# Patient Record
Sex: MALE | Race: WHITE | Employment: FULL TIME | URBAN - METROPOLITAN AREA
[De-identification: names, ages, dates, MRNs, and addresses within clinical notes are randomized per-mention and may not be internally consistent; named-entity substitution may affect disease eponyms.]

---

## 2020-10-06 PROBLEM — Z09 POSTOP CHECK: Status: ACTIVE | Noted: 2020-06-09

## 2021-05-04 PROBLEM — E55.9 VITAMIN D DEFICIENCY: Status: ACTIVE | Noted: 2021-05-04

## 2021-05-04 PROBLEM — R79.89 LOW TESTOSTERONE IN MALE: Status: ACTIVE | Noted: 2021-05-04

## 2021-05-04 PROBLEM — E29.1 HYPOGONADISM IN MALE: Status: ACTIVE | Noted: 2021-05-04

## 2022-03-18 PROBLEM — Z09 POSTOP CHECK: Status: ACTIVE | Noted: 2020-06-09

## 2022-03-19 PROBLEM — E29.1 HYPOGONADISM IN MALE: Status: ACTIVE | Noted: 2021-05-04

## 2022-03-19 PROBLEM — E55.9 VITAMIN D DEFICIENCY: Status: ACTIVE | Noted: 2021-05-04

## 2022-03-19 PROBLEM — R79.89 LOW TESTOSTERONE IN MALE: Status: ACTIVE | Noted: 2021-05-04

## 2022-04-04 ENCOUNTER — HOSPITAL ENCOUNTER (OUTPATIENT)
Dept: SLEEP MEDICINE | Age: 52
Discharge: HOME | End: 2022-04-04
Payer: COMMERCIAL

## 2022-04-04 PROCEDURE — 95810 POLYSOM 6/> YRS 4/> PARAM: CPT

## 2022-05-16 ENCOUNTER — HOSPITAL ENCOUNTER (OUTPATIENT)
Dept: SLEEP MEDICINE | Age: 52
Discharge: HOME | End: 2022-05-16
Payer: COMMERCIAL

## 2022-05-16 PROCEDURE — 95811 POLYSOM 6/>YRS CPAP 4/> PARM: CPT

## 2022-06-02 ASSESSMENT — SLEEP AND FATIGUE QUESTIONNAIRES
HOW LIKELY ARE YOU TO NOD OFF OR FALL ASLEEP WHILE SITTING AND TALKING TO SOMEONE: 1
HOW LIKELY ARE YOU TO NOD OFF OR FALL ASLEEP WHILE WATCHING TV: 3
HOW LIKELY ARE YOU TO NOD OFF OR FALL ASLEEP WHEN YOU ARE A PASSENGER IN A CAR FOR AN HOUR WITHOUT A BREAK: 3
HOW LIKELY ARE YOU TO NOD OFF OR FALL ASLEEP IN A CAR, WHILE STOPPED FOR A FEW MINUTES IN TRAFFIC: 3
HOW LIKELY ARE YOU TO NOD OFF OR FALL ASLEEP WHILE SITTING AND READING: 3
HOW LIKELY ARE YOU TO NOD OFF OR FALL ASLEEP WHILE SITTING QUIETLY AFTER LUNCH WITHOUT ALCOHOL: 3
HOW LIKELY ARE YOU TO NOD OFF OR FALL ASLEEP WHILE LYING DOWN TO REST IN THE AFTERNOON WHEN CIRCUMSTANCES PERMIT: 3
ESS TOTAL SCORE: 21
HOW LIKELY ARE YOU TO NOD OFF OR FALL ASLEEP WHILE SITTING INACTIVE IN A PUBLIC PLACE: 2

## 2022-06-02 NOTE — PROGRESS NOTES
[address] [place] [address] Court, [address] [facility]  [phone]    Patient Name:  Otto Marcum  YOB: 1970      Office Visit 6/3/2022    CHIEF COMPLAINT:    Chief Complaint   Patient presents with    New Patient    Sleep Apnea       HISTORY OF PRESENT ILLNESS:      The patient presents in outpatient consultation at the request of ZUHAIR Dozier  for management of obstructive sleep apnea. PMH includes allergies, hypogonadism, vitamin D deficiency. Patient reports worsening daytime fatigue. He states that he will get drowsy with [place] [address] and has dozed when sitting in traffic. He works at [employer] from 6:30am-5:40 pm, 4 days per week. On days off, he naps about an hour in the afternoons. He reports snoring, witnessed apneas for many years. He always sleeps laterally. He wakes 7+ times per night and still has non-restorative sleep. He states that he will toss and turn and wake up 3+ times with nocturia. He has recently lost 10 pounds through portion control and eating out less. His current weight is 187 pounds. He drinks coffee in the mornings to help stay awake and tea in the afternoons. He denies any tobacco use. He drinks one to two beers per night and sometimes more on the weekends. He reports nocturnal gasping and vivid dreams. ESS 21/24. The diagnostic polysomnography was notable for an apnea hypopnea index of 37.8/hr including 10 obstructive apneas, 55 mixed apneas, 145 central apneas, 52 hypopneas. Oxygen desaturations are low as 83% were noted with SpO2 less than 89% for a total of 15.1 minutes of the test.  Significant cardiac arrhythmias were not evident. The patient was noted to have moderate limb movements with a limb movement arousal index being about 1.2. A subsequent CPAP titration study was conducted. CPAP levels as high as 13cm were performed. CPAP was effective in eliminating disordered breathing.  CPAP was tolerated well by the patient. The patient reports feeling more rested and less fatigued the day following. He was not studied in supine position on optimal pressure therefore, APAP 13-15 cm recommended. He is ready to get started on pap therapy. History reviewed. No pertinent past medical history. Patient Active Problem List   Diagnosis    Postop check    Low testosterone in male    Vitamin D deficiency    Hypogonadism in male          Past Surgical History:   Procedure Laterality Date    CHOLECYSTECTOMY, LAPAROSCOPIC  05/26/2020           Social History     Socioeconomic History    Marital status:      Spouse name: Not on file    Number of children: Not on file    Years of education: Not on file    Highest education level: Not on file   Occupational History    Not on file   Tobacco Use    Smoking status: Never Smoker    Smokeless tobacco: Never Used   Substance and Sexual Activity    Alcohol use: Yes     Alcohol/week: 7.0 standard drinks    Drug use: No    Sexual activity: Not on file   Other Topics Concern    Not on file   Social History Narrative    Not on file     Social Determinants of Health     Financial Resource Strain:     Difficulty of Paying Living Expenses: Not on file   Food Insecurity:     Worried About Running Out of Food in the Last Year: Not on file    Sridhar of Food in the Last Year: Not on file   Transportation Needs:     Lack of Transportation (Medical): Not on file    Lack of Transportation (Non-Medical):  Not on file   Physical Activity:     Days of Exercise per Week: Not on file    Minutes of Exercise per Session: Not on file   Stress:     Feeling of Stress : Not on file   Social Connections:     Frequency of Communication with Friends and Family: Not on file    Frequency of Social Gatherings with Friends and Family: Not on file    Attends Gnosticism Services: Not on file    Active Member of Clubs or Organizations: Not on file    Attends Club or Organization Meetings: Not on file    Marital Status: Not on file   Intimate Partner Violence:     Fear of Current or Ex-Partner: Not on file    Emotionally Abused: Not on file    Physically Abused: Not on file    Sexually Abused: Not on file   Housing Stability:     Unable to Pay for Housing in the Last Year: Not on file    Number of [place] in the Last Year: Not on file    Unstable Housing in the Last Year: Not on file         Family History   Problem Relation Age of Onset    No Known Problems Mother     No Known Problems Father     No Known Problems Maternal Grandmother     No Known Problems Maternal Grandfather     No Known Problems Paternal Grandmother     No Known Problems Paternal Grandfather          No Known Allergies      Current Outpatient Medications   Medication Sig    cetirizine (ZYRTEC) 10 MG tablet Take 10 mg by mouth daily    fluticasone (FLONASE) 50 MCG/ACT nasal spray 2 sprays by Nasal route 2 times daily     No current facility-administered medications for this visit. REVIEW OF SYSTEMS:   CONSTITUTIONAL: weight loss    There is no history of fever, chills, night sweats,  weight gain, persistent fatigue, or lethargy/hypersomnolence. EYES:   Denies problems with eye pain, erythema, blurred vision, or visual field loss. ENTM:   Denies history of tinnitus, epistaxis, sore throat, hoarseness, or dysphonia. LYMPH:   Denies swollen glands. CARDIAC:   No chest pain, pressure, discomfort, palpitations, orthopnea, murmurs, or edema. GI:   No dysphagia, heartburn reflux, nausea/vomiting, diarrhea, abdominal pain, or bleeding. :   Denies history of dysuria, hematuria, polyuria, or decreased urine output. MS:   No history of myalgias, arthralgias, bone pain, or muscle cramps. SKIN:   No history of rashes, jaundice, cyanosis, nodules, or ulcers. ENDO:   Negative for heat or cold intolerance. No history of DM.    PSYCH:   Negative for anxiety, depression, insomnia, hallucinations. NEURO:   There is no history of AMS, persistent headache, decreased level of consciousness, seizures, or motor or sensory deficits. PHYSICAL EXAM:    Vitals:    06/03/22 1527   BP: 132/82   Pulse: 89   Resp: 14   Temp: 97.3 °F (36.3 °C)   SpO2: 96%   weight 187 lbs, BMI 27.62     GENERAL APPEARANCE:   The patient is overweight, BMI, 27,  and in no respiratory distress. HEENT:   PERRL. Conjunctivae unremarkable. Nasal mucosa is without epistaxis, exudate, or polyps. Gums and dentition are unremarkable. There is no oropharyngeal narrowing. TMs are clear. NECK/LYMPHATIC:   Symmetrical with no elevation of jugular venous pulsation. Trachea midline. No thyroid enlargement. No cervical adenopathy. LUNGS:   Normal respiratory effort with symmetrical lung expansion. Breath sounds clear bilaterally. HEART:   There is a regular rate and rhythm. No murmur, rub, or gallop. There is no edema in the lower extremities. ABDOMEN:   Soft and non-tender. No hepatosplenomegaly. Bowel sounds are normal.     SKIN:   There are no rashes, cyanosis, jaundice, or ecchymosis present. EXTREMITIES:   The extremities are unremarkable without clubbing, cyanosis, joint inflammation, degenerative, or ischemic change. MUSCULOSKELETAL:   There is no abnormal tone, muscle atrophy, or abnormal movement present. NEURO:   The patient is alert and oriented to person, place, and time. Memory appears intact and mood is normal.  No gross sensorimotor deficits are present. DIAGNOSTIC TESTS:  Nspg 4/4/22      Cpap Titration 5/16/22        ASSESSMENT:  (Medical Decision Making)        Diagnosis Orders   1. JUANIS (obstructive sleep apnea) this is severe. Improved with pap therapy   The pathophysiology of obstructive sleep apnea was reviewed with the patient. It's potential to promote severe neurologic, cardiac, pulmonary, and gastrointestinal problems was discussed.  Specifically, the increased incidence of hypertension, coronary artery disease, congestive heart failure, pulmonary hypertension, gastroesophageal reflux, pathologic hypersomnolence, memory loss, and glucose intolerance was related to the consequences of hypoxemia, hypercapnia, airway obstruction, and sympathetic overdrive. We also discussed the ability of nasal CPAP to correct these abnormalities through maintenance of a patent airway. Therapeutic options including surgery, oral appliances, and weight loss were also reviewed. DME - DURABLE MEDICAL EQUIPMENT   2. Nocturnal hypoxemia - resolved with pap therapy    DME - DURABLE MEDICAL EQUIPMENT   3. Central sleep apnea - moderate, noted on study, improved with CPAP  DME - DURABLE MEDICAL EQUIPMENT   4. Poor sleep hygiene - with nightly alcohol use which can disrupt sleep architecture and promote sleep apnea. Encouraged to limit alcohol and avoid within 3-4 hours of bedtime. DME - DURABLE MEDICAL EQUIPMENT          PLAN:  Start APAP 13-15 cm with nasal mask. Nightly use encouraged  Set up will be ASAP with [employer]. Follow up in 3-4 months or sooner if needed      Orders Placed This Encounter   Procedures    DME - [address] [place] [address] [facility]  Phone: [employer] [address] [address]  Dept: [phone]      Patient Name: Domenic Bhakta  : 1970  Gender: male  Address: [place] [id number]  Patient phone number: [phone] (home)       Primary Insurance: Payor: [employer] / Plan: Mayda Natarajan / Product Type: *No Product type* /   Subscriber ID: [id number] - ([facility])      [facility] Supply Order  Order Details     DME Location: [facility], please set up asap   Order Date: 6/3/2022   There were no encounter diagnoses.           (  X   )New Set-Up     apap machine   (     )[id number] CPAP Unit  ( x    ) Auto CPAP Unit  (     ) BiLevel Unit  (     ) Auto BiLevel Unit  (     ) ASV   ( ) Bilevel ST    (     ) Oxygen Concentrator         Length of need: 12 months    Pressure: 13-15  cmH20  EPR:      Starting Ramp Pressure:   cm H20  Ramp Time: min      Patient had a diagnostic Apnea Hypopnea Index (AHI) of :  37.8/hr    *SUPPLIES* Replace all as needed, or per coverage guidelines     Masks Type:    (     ) -Full Face Mask (1 per 3 mon)  (     ) -Full Mask (1 per month) Interface/Cushion      (   x  ) -Nasal Mask (1 per 3 mon)  (  x   ) - Nasal Mask (1 per month) Interface/Cushion  (   x  ) -Pillow (2 per mon)  (   x  ) -Kpzseuhqe (1 per 6 mon)      _________________________________________________________________          Other Supplies:    (  X   )-Qjizwgob (1 per 6 mon)  ( X    )-Njjdxh Tubing (1 per 3 mon)  (  X   )- Disposable Filter (2 per mon)  (   X  )-Yyodcf Humidifier (1 per year)     (  x   )-Fdnfwvxyy (sometimes used with Full Face Mask) (1 per 6 mos)  (     )-Tubing-without heat (1 per 3 mos)  ( X   )-Non-Disposable Filter (1 per 6 mos)  (   x  )-Water Chamber (1 per 6 mos)  (     )-Humidifier non-heated (1 per 5 yrs)      Signed Date: 6/3/2022  Electronically Signed By: MANI Gonzalez Aas, CNP      No orders of the defined types were placed in this encounter. Collaborating Physician: Dr. Amy Kirk     Over 50% of today's office visit was spent in face to face time reviewing test results, prognosis, importance of compliance, education about disease process, benefits of medications, instructions for management of acute flare-ups, and follow up plans. Total face to face time spent with patient was 35 minutes.     MANI Gonzalez Aas, CNP  Electronically signed

## 2022-06-03 ENCOUNTER — OFFICE VISIT (OUTPATIENT)
Dept: SLEEP MEDICINE | Age: 52
End: 2022-06-03
Payer: COMMERCIAL

## 2022-06-03 VITALS
RESPIRATION RATE: 14 BRPM | OXYGEN SATURATION: 96 % | HEIGHT: 69 IN | TEMPERATURE: 97.3 F | WEIGHT: 187 LBS | HEART RATE: 89 BPM | SYSTOLIC BLOOD PRESSURE: 132 MMHG | DIASTOLIC BLOOD PRESSURE: 82 MMHG | BODY MASS INDEX: 27.7 KG/M2

## 2022-06-03 DIAGNOSIS — G47.34 NOCTURNAL HYPOXEMIA: ICD-10-CM

## 2022-06-03 DIAGNOSIS — Z72.821 POOR SLEEP HYGIENE: ICD-10-CM

## 2022-06-03 DIAGNOSIS — G47.31 CENTRAL SLEEP APNEA: ICD-10-CM

## 2022-06-03 DIAGNOSIS — G47.33 OSA (OBSTRUCTIVE SLEEP APNEA): Primary | ICD-10-CM

## 2022-06-03 PROCEDURE — 99203 OFFICE O/P NEW LOW 30 MIN: CPT | Performed by: NURSE PRACTITIONER

## 2022-06-03 NOTE — PATIENT INSTRUCTIONS
Start APAP 13-15 cm. Order will be sent to [address] [place] [address] for set up asap  Nightly compliance encouraged    Follow up in 3-4 months  The company who will be taking care of your CPAP supplies is:     Address: [address] [place] [address] [address], [facility], [address] [place] [address]  Phone: [phone] Option #1  Fax: [phone]

## 2022-09-06 RX ORDER — FLUTICASONE PROPIONATE 50 MCG
SPRAY, SUSPENSION (ML) NASAL
Qty: 48 G | OUTPATIENT
Start: 2022-09-06

## 2022-12-20 NOTE — PROGRESS NOTES
[address] [place] [address] Court, [address] [facility]  [phone]    PATIENT ID    Mr. Raegan Toussaint  1970  His primary provider is Kera Montano PA-C    CC: Mr. Roxie Robertson returns to the clinic today for follow up of his obstructive sleep apnea. INTERVAL HISTORY  Mr. Roxie Robertson was originally diagnosed with severe combined obstructive and central sleep apnea obstructive sleep apnea in 2022 with an AHI of 37 central apnea index 21, subsequently underwent CPAP titration with successful control at APAP 12-16 cm H2O. He has past medical history notable for allergic rhinitis low vitamin D low testosterone  he is here for follow-up and I am meeting him for the first time today. He was unaware that he has central sleep apnea as well as obstructive sleep apnea, denies any history of difficulty with sedation, he has not had his colonoscopy yet. He notes he had severe daytime sleepiness that has improved dramatically since being on therapy but is still not completely controlled. He has been struggling with the mask leak, he notes he never has been able to sleep on his back. He is using what sounds like a FP Vitera mask but did not bring his mask or machine today, he gets his equipment at [employer]. He has not had any significant health changes or medication changes since last visit. He denies any lower extremity edema orthopnea or PND, no history of congestive heart failure, no neurologic history of seizures or muscle weakness. He was last seen by Kelly Benton in 6/3/2022 and was started on auto CPAP therapy. Since implementing CPAP therapy Mr. Roxie Robertson feels more rested and less fatigued. On CPAP download review today he is utilizing his CPAP machine 79% of the time, greater than 4 hours per night, for a median daily usage of 5 hours 39 minutes per night. His AHI is down to 1.2 and his leak rate is unacceptably high.   Past medical and surgical histories, medications and allergies, problem list, and social history were reviewed. ROS   Review of systems was otherwise negative unless noted in HPI. Sleep Medicine 12/21/2022 6/2/2022   Sitting and reading 2 3   Watching TV 2 3   Sitting, inactive in a public place (e.g. a theatre or a meeting) 2 2   As a passenger in a car for an hour without a break 0 3   Lying down to rest in the afternoon when circumstances permit 2 3   Sitting and talking to someone 0 1   Sitting quietly after a lunch without alcohol 2 3   In a car, while stopped for a few minutes in traffic 0 3   [place] Sleepiness Score 10 21           MEDS  Current Outpatient Medications   Medication Sig Dispense Refill    fluticasone (FLONASE) 50 MCG/ACT nasal spray 2 sprays by Nasal route 2 times daily 16 g 2    cetirizine (ZYRTEC) 10 MG tablet Take 10 mg by mouth daily       No current facility-administered medications for this visit. ALLERGIES  No Known Allergies    OBJECTIVE   BP (!) 140/78   Pulse 91   Temp 97.5 °F (36.4 °C)   Resp 16   Ht 5' 8\" (1.727 m)   Wt 196 lb 1.6 oz (89 kg)   SpO2 97%   BMI 29.82 kg/m²         Exam:  Constitutional: Pleasant 50 y.o. male, appears well-developed and well-nourished. Not in acute distress. Eyes: Conjunctivae and lids appear normal. No pallor or icterus   Skin: Not diaphoretic. No lesions  HENT: Head normocephalic. Nares very tight nasal passages with boggy mucosa, no drainage oropharynx clear without erythema or exudate. Moist mucous membranes. Mallampati is 2, minimal tonsillar tissue. Tongue is normal. Patient has adequate dentition. Normal palate. Mild retrognathia  Cardiovascular: nl s1/s2, RRR no m/r/g   Pulmonary: Respiratory effort is normal without labored breathing or accessory muscle use. No respiratory distress/cough/wheezng  Neurological: alert , Attention, speech and language are normal. There is facial symmetry. Musculoskeletal:  Moves all extremities equally and no focal weakness noted. Gait is unassisted.   Psychiatric: pleasant, mood and affect well adjusted. cooperative and behavior is appropriate. Results:  Polysomnogram from 4/22 as well as 5/22 reviewed            ASSESSMENT AND PLAN  1. JUANIS (obstructive sleep apnea)  -     Ambulatory referral to Cardiology  2. Seasonal allergic rhinitis, unspecified trigger  Assessment & Plan:  Patient with nasal congestion on exam today, will refill Flonase as well as restart cetirizine, maintaining upper airway patency important for sleep disordered breathing. He should probably avoid nasal masks in the near future. Orders:  -     fluticasone (FLONASE) 50 MCG/ACT nasal spray; 2 sprays by Nasal route 2 times daily, Disp-16 g, R-2Normal  3. CSA (central sleep apnea)  Assessment & Plan:  Patient has combined obstructive and central sleep apnea with predominance of central events, he has not had a work-up for central sleep apnea, normal exam today. Given severe disease we will order echocardiogram to both rule out pulmonary hypertension as well as congestive heart failure although I have a low clinical suspicion for heart failure based on exam, will order brain MRI to rule out structural lesion as well as ABG to rule out hypoventilation. We will go over results at follow-up in 2 months, sooner if needed. In terms of PAP therapy we did discuss measures to help increase mask seal and we will order a mask fitting, we will continue current settings for now. We did discuss how sedation and narcotics can make central sleep apnea worse. Orders:  -     Ambulatory referral to Cardiology  -     MRI BRAIN WO CONTRAST; Future  -     Miscellaneous Sendout; Future       Mr. Susanne Mace is  compliant with appropriate usage and getting good clinical response to his JUANIS treatment. The change in his AHI was compared to the baseline, and the benefits of treatment were discussed. Usage, with need for compliance to see best benefits, was explained with need to use the device more than 4 hours a night. Usage for more than 6 hours would provide even further benefit and was explained. He is currently at a median use of near 6 hours. He although compliant, still needs to improve on total usage hours per night. Equipment orders will be renewed as appropriate. Instructions on CPAP usage, proper maintenance and device cleaning were included in the after visit summary today. He should not drive if drowsy or if he has had inadequate sleep. I will see him back in 2 months. Time Spent: Time spent total 40 minutes exclusive of procedures, during the 24 hour period of the date of encounter: preparing to see the patient (e.g. reviewing chart notes, tests), performing a medically appropriate examination and/or evaluation, documenting clinical information in the electronic or other health record, counseling and educating the patient/family/caregiver, obtaining and/or reviewing separately obtained history and ordering medications, tests, procedures. CPAP download was reviewed with the patient in detail today. Jaclyn Perea Is doing well today and getting excellent clinical response from CPAP therapy. I encouraged him to continue utilizing his CPAP machine.      Milton Mensah MD  Sleep Medicine/Internal Medicine/Pediatrics

## 2022-12-21 ENCOUNTER — HOSPITAL ENCOUNTER (OUTPATIENT)
Dept: LAB | Age: 52
Discharge: HOME | End: 2022-12-24
Payer: COMMERCIAL

## 2022-12-21 ENCOUNTER — OFFICE VISIT (OUTPATIENT)
Dept: SLEEP MEDICINE | Age: 52
End: 2022-12-21
Payer: COMMERCIAL

## 2022-12-21 VITALS
SYSTOLIC BLOOD PRESSURE: 140 MMHG | HEART RATE: 91 BPM | RESPIRATION RATE: 16 BRPM | TEMPERATURE: 97.5 F | HEIGHT: 68 IN | WEIGHT: 196.1 LBS | DIASTOLIC BLOOD PRESSURE: 78 MMHG | BODY MASS INDEX: 29.72 KG/M2 | OXYGEN SATURATION: 97 %

## 2022-12-21 DIAGNOSIS — G47.31 CSA (CENTRAL SLEEP APNEA): ICD-10-CM

## 2022-12-21 DIAGNOSIS — G47.33 OSA (OBSTRUCTIVE SLEEP APNEA): Primary | ICD-10-CM

## 2022-12-21 DIAGNOSIS — J30.2 SEASONAL ALLERGIC RHINITIS, UNSPECIFIED TRIGGER: ICD-10-CM

## 2022-12-21 LAB
BASE EXCESS BLDV CALC-SCNC: 4.2 MMOL/L
HCO3 BLDV-SCNC: 32.1 MMOL/L (ref 23–28)
PCO2 BLDV: 58.3 MMHG (ref 41–51)
PH BLDV: 7.35 [PH] (ref 7.32–7.42)
PO2 BLDV: 27 MMHG
SAO2 % BLDV: 45.8 % (ref 65–88)
SERVICE CMNT-IMP: ABNORMAL
SERVICE CMNT-IMP: ABNORMAL
SPECIMEN TYPE: ABNORMAL

## 2022-12-21 PROCEDURE — 82803 BLOOD GASES ANY COMBINATION: CPT

## 2022-12-21 PROCEDURE — 99215 OFFICE O/P EST HI 40 MIN: CPT | Performed by: INTERNAL MEDICINE

## 2022-12-21 RX ORDER — FLUTICASONE PROPIONATE 50 MCG
2 SPRAY, SUSPENSION (ML) NASAL 2 TIMES DAILY
Qty: 16 G | Refills: 2 | Status: SHIPPED | OUTPATIENT
Start: 2022-12-21

## 2022-12-21 ASSESSMENT — SLEEP AND FATIGUE QUESTIONNAIRES
HOW LIKELY ARE YOU TO NOD OFF OR FALL ASLEEP WHILE SITTING AND TALKING TO SOMEONE: 0
HOW LIKELY ARE YOU TO NOD OFF OR FALL ASLEEP WHILE SITTING INACTIVE IN A PUBLIC PLACE: 2
HOW LIKELY ARE YOU TO NOD OFF OR FALL ASLEEP WHILE SITTING AND READING: 2
ESS TOTAL SCORE: 10
HOW LIKELY ARE YOU TO NOD OFF OR FALL ASLEEP IN A CAR, WHILE STOPPED FOR A FEW MINUTES IN TRAFFIC: 0
HOW LIKELY ARE YOU TO NOD OFF OR FALL ASLEEP WHILE LYING DOWN TO REST IN THE AFTERNOON WHEN CIRCUMSTANCES PERMIT: 2
HOW LIKELY ARE YOU TO NOD OFF OR FALL ASLEEP WHILE SITTING QUIETLY AFTER LUNCH WITHOUT ALCOHOL: 2
HOW LIKELY ARE YOU TO NOD OFF OR FALL ASLEEP WHILE WATCHING TV: 2
HOW LIKELY ARE YOU TO NOD OFF OR FALL ASLEEP WHEN YOU ARE A PASSENGER IN A CAR FOR AN HOUR WITHOUT A BREAK: 0

## 2022-12-21 NOTE — ASSESSMENT & PLAN NOTE
Patient has combined obstructive and central sleep apnea with predominance of central events, he has not had a work-up for central sleep apnea, normal exam today. Given severe disease we will order echocardiogram to both rule out pulmonary hypertension as well as congestive heart failure although I have a low clinical suspicion for heart failure based on exam, will order brain MRI to rule out structural lesion as well as ABG to rule out hypoventilation. We will go over results at follow-up in 2 months, sooner if needed. In terms of PAP therapy we did discuss measures to help increase mask seal and we will order a mask fitting, we will continue current settings for now. We did discuss how sedation and narcotics can make central sleep apnea worse.

## 2022-12-21 NOTE — ASSESSMENT & PLAN NOTE
This is dramatically improved on PAP therapy, will send refills of supplies and mask fitting order to [facility] today.

## 2022-12-21 NOTE — PATIENT INSTRUCTIONS
It was a pleasure meeting you today. Here are some items that we discussed:    1. You have combined central and obstructive sleep apnea. 2.   We will do work up for your central sleep apnea to included the following:  -echocardiogram  -brain MRI  -arterial blood gas    3. We will order a new mask fitting with [employer] today, they should call you in a week, will order supplies as well. If you are still having leak/issues with cpap bring your machine and gear to followup appointment.  -for now be sure to wash your mask every other day with warm soapy water, keep stubble to a minimum to help with seal.  -look into CPAP pillow on [employer]    4. Continue flonase/cetirizine for nasal congestion/allergies    Zeenat Yusuf MD  [phone]     Please continue to use your CPAPYou are getting a good response with it. To get the best benefits from CPAP therapy you will need to use your device for all periods of sleep, this includes naps. Please do not drive if you are sleepy. I will see you back in 2 months. See your Durable Medical Equipment (DME) provider:  For any mask fit issues and equipment replacement  Use it every time you go to sleep, day or night. Replace your mask cushion, headgear, and filters regularly. Clean your mask daily and the rest every one to two weeks. If any other issues or questions about your use of CPAP, mask, or pressure, arise, please call your medical equipment company. If you are still having issues, please  use [employer] or call to contact our sleep clinic so that we can assist you. If you have issues that are not about your CPAP (example: medication refills or side effects), please call the  at [phone]. Please work on maintaining a healthy weight. A BMI <30 is considered a healthy weight. Current Body mass index is 29.82 kg/m². . If you are overweight, a loss of 2 pounds per month still comes out to about 25 pounds per year and could allow a reduction in CPAP pressure or even discontinuation of CPAP. Cleaning instructions      Daily:  Cushion on mask: wash with soap/water OR wipe with an alcohol-free baby wipe    Once per Week:  Mask (frame/headgear): wash with soap/water   Filter: check for cleanliness, Replace (do not wash) monthly, or more often whenever dawn/dirty    Twice a month   Water Tub / Tubing: soak for 20-30 minutes in solution then rinse          Soaking Options: 1) water/vinegar solution (3 parts water, 1 part vinegar)     Some companies may tell you to add a little bit of bleach. However I find that bleach to be too toxic and difficult to rinse adequately. Supply Replacement Schedule (what insurance will cover)    You may not need to replace all parts this frequently if you are doing proper cleaning. Filters: 2 per month  Nasal Cushion/Pillow: 2 per month  Full Face cushion: 1 per month  Tubin every 3 months  Full Mask: 1 every 6 months  Headgear: 1 every 6 months  Water Chamber: 1 every 6 months  Chinstrap: 1 every 6 months      Many patients struggle to find comfortable heat and humidity settings on their device. If you are having issues with condensation in your mask or tubing, it usually means your temperature is too low, and/or your humidity is too high. If you are experiencing nasal congestion, it usually means you would benefit from higher humidity setting. Most people on nasal pillows are more comfortable if the heat and humidity are relatively high, such as 84-86 degrees, and 5-6 humidity. You can adjust these settings yourself to find what is comfortable for you. You can always call us or your equipment company for help, as well. Contact your equipment vendor for replacement supplies whenever in need.

## 2022-12-21 NOTE — ASSESSMENT & PLAN NOTE
Patient with nasal congestion on exam today, will refill Flonase as well as restart cetirizine, maintaining upper airway patency important for sleep disordered breathing. He should probably avoid nasal masks in the near future.

## 2023-01-11 ENCOUNTER — INITIAL CONSULT (OUTPATIENT)
Dept: CARDIOLOGY CLINIC | Age: 53
End: 2023-01-11
Payer: COMMERCIAL

## 2023-01-11 VITALS
HEIGHT: 68 IN | SYSTOLIC BLOOD PRESSURE: 156 MMHG | BODY MASS INDEX: 29.55 KG/M2 | WEIGHT: 195 LBS | HEART RATE: 71 BPM | DIASTOLIC BLOOD PRESSURE: 100 MMHG

## 2023-01-11 DIAGNOSIS — I51.7 LVH (LEFT VENTRICULAR HYPERTROPHY): ICD-10-CM

## 2023-01-11 DIAGNOSIS — Z76.89 ENCOUNTER TO ESTABLISH CARE: Primary | ICD-10-CM

## 2023-01-11 PROCEDURE — 93000 ELECTROCARDIOGRAM COMPLETE: CPT | Performed by: INTERNAL MEDICINE

## 2023-01-11 PROCEDURE — 99203 OFFICE O/P NEW LOW 30 MIN: CPT | Performed by: INTERNAL MEDICINE

## 2023-01-11 ASSESSMENT — ENCOUNTER SYMPTOMS
SHORTNESS OF BREATH: 0
RESPIRATORY NEGATIVE: 1
GASTROINTESTINAL NEGATIVE: 1
EYES NEGATIVE: 1
EYE PAIN: 0
ALLERGIC/IMMUNOLOGIC NEGATIVE: 1
BACK PAIN: 0
CHEST TIGHTNESS: 0
PHOTOPHOBIA: 0
ABDOMINAL PAIN: 0

## 2023-01-11 NOTE — PROGRESS NOTES
[address], [address] [facility], [address] [place] [address]  PHONE: [phone]    Marii Harry  1970    SUBJECTIVE:   Marii Harry is a 50 y.o. male seen for initial evaluation of:      Chief Complaint   Patient presents with    Consultation      evaluate patient for Echocardiogram test only for central sleep apnea, r/o chf        Cardiac Hx (Reviewed and summarized by me):  1) JUANIS (?central sleep apnea)  2) HTN  3) LVH by ECG criteria    HPI:  82-year-old male followed by sleep medicine they are concerned he may have central sleep apnea. Sleep medicine wanted to get an echocardiogram to rule out any cardiac issues. His EKG does show signs of LVH. His blood pressure is elevated. Chest pain palpitations orthopnea PND lower extremity edema. ECG: SR with LVH and associated repolarization abnormalities    Past Medical History, Past Surgical History, Family history, Social History, and Medications were all reviewed with the patient today and updated as necessary. Current Outpatient Medications:     fluticasone (FLONASE) 50 MCG/ACT nasal spray, 2 sprays by Nasal route 2 times daily, Disp: 16 g, Rfl: 2    cetirizine (ZYRTEC) 10 MG tablet, Take 10 mg by mouth daily, Disp: , Rfl:   No Known Allergies  History reviewed. No pertinent past medical history. Past Surgical History:   Procedure Laterality Date    CHOLECYSTECTOMY, LAPAROSCOPIC  05/26/2020     Family History   Problem Relation Age of Onset    No Known Problems Mother     No Known Problems Father     No Known Problems Maternal Grandmother     No Known Problems Maternal Grandfather     No Known Problems Paternal Grandmother     No Known Problems Paternal Grandfather      Social History     Tobacco Use    Smoking status: Never    Smokeless tobacco: Never   Substance Use Topics    Alcohol use: Yes     Alcohol/week: 7.0 standard drinks       ROS:  Review of Systems   Constitutional: Negative. Negative for fever.    HENT:  Negative for hearing loss, nosebleeds and tinnitus. Eyes: Negative. Negative for photophobia and pain. Respiratory: Negative. Negative for chest tightness and shortness of breath. Cardiovascular: Negative. Negative for chest pain, palpitations and leg swelling. Gastrointestinal: Negative. Negative for abdominal pain. Endocrine: Negative. Negative for cold intolerance and heat intolerance. Genitourinary: Negative. Negative for dysuria. Musculoskeletal: Negative. Negative for back pain and joint swelling. Skin: Negative. Negative for rash. Allergic/Immunologic: Negative. Negative for immunocompromised state. Neurological: Negative. Negative for dizziness, syncope and light-headedness. Hematological: Negative. Does not bruise/bleed easily. Psychiatric/Behavioral: Negative. Negative for suicidal ideas. PHYSICAL EXAM:  Physical Exam  Constitutional:       General: He is not in acute distress. Appearance: He is not ill-appearing. HENT:      Head: Normocephalic and atraumatic. Nose: No congestion. Mouth/Throat:      Mouth: Mucous membranes are moist.   Eyes:      Extraocular Movements: Extraocular movements intact. Pupils: Pupils are equal, round, and reactive to light. Cardiovascular:      Rate and Rhythm: Normal rate and regular rhythm. Heart sounds: No murmur heard. No friction rub. No gallop. Pulmonary:      Effort: No respiratory distress. Breath sounds: No wheezing or rhonchi. Musculoskeletal:         General: No swelling. Cervical back: Normal range of motion. Right lower leg: No edema. Left lower leg: No edema. Skin:     General: Skin is warm and dry. Findings: No rash. Neurological:      General: No focal deficit present. Mental Status: He is oriented to person, place, and time.    Psychiatric:         Mood and Affect: Mood normal.         Behavior: Behavior normal.         Judgment: Judgment normal.        BP (!) 156/100 Pulse 71   Ht 5' 8\" (1.727 m)   Wt 195 lb (88.5 kg)   BMI 29.65 kg/m²      Wt Readings from Last 10 Encounters:   01/11/23 195 lb (88.5 kg)   12/21/22 196 lb 1.6 oz (89 kg)   06/03/22 187 lb (84.8 kg)   03/14/22 197 lb (89.4 kg)   03/18/21 197 lb 3.2 oz (89.4 kg)           Medical problems and test results were reviewed with the patient today. Lab Results   Component Value Date/Time    BUN 11 11/04/2020 10:46 AM     No results found for: ALEX, CREAPOC, CREA  Lab Results   Component Value Date/Time    K 5.0 11/04/2020 10:46 AM       Lab Results   Component Value Date/Time    CHOL 202 11/04/2020 10:46 AM    HDL 45 11/04/2020 10:46 AM    VLDL 24 11/04/2020 10:46 AM       ASSESSMENT and PLAN    ICD-10-CM    1. Encounter to establish care  Z76.89 EKG 12 lead      2. LVH (left ventricular hypertrophy)  I51.7 Transthoracic echocardiogram (TTE) complete with contrast, bubble, strain, and 3D PRN          IMPRESSION:  1) Central Sleep Apnea - check echo  2) Abnormal ECG - echo as above  3) HTN - asked him to keep BP log will discuss at follow up, may be white coat htn. ORDERS  Orders Placed This Encounter    EKG 12 lead     Order Specific Question:   Reason for Exam?     Answer: Other       Follow up in 1 months. Thank you for allowing me to participate in this patient's care. Please call or contact me if there are any questions or concerns regarding the above.       Diana Hassan MD  01/11/23  8:32 AM

## 2023-01-12 ENCOUNTER — HOSPITAL ENCOUNTER (OUTPATIENT)
Dept: MRI IMAGING | Age: 53
Discharge: HOME | End: 2023-01-12
Payer: COMMERCIAL

## 2023-01-12 DIAGNOSIS — G47.31 CSA (CENTRAL SLEEP APNEA): ICD-10-CM

## 2023-01-12 PROCEDURE — 70551 MRI BRAIN STEM W/O DYE: CPT

## 2023-01-13 ENCOUNTER — TELEPHONE (OUTPATIENT)
Dept: SLEEP MEDICINE | Age: 53
End: 2023-01-13

## 2023-01-13 DIAGNOSIS — G47.31 CSA (CENTRAL SLEEP APNEA): Primary | ICD-10-CM

## 2023-01-13 NOTE — TELEPHONE ENCOUNTER
Brain MRI ordered for primary central sleep apnea and does show mild Chiari malformation, call patient to review the results and recommend neurosurgery evaluation, he is agreeable so referral will be placed today, for now he is to continue CPAP. We discussed I am not sure if mild disease warrants decompression surgery or not. 1/13/2022 brain MRI results  1. Mild Chiari I malformation. 2. Nonspecific white matter changes likely related to headaches, although mild   early microangiopathy cannot be excluded. 3. Extensive right maxillary, ethmoid, and frontal sinusitis.      Will keep follow-up as scheduled with Dr. Cintron Rom

## 2023-02-13 ENCOUNTER — OFFICE VISIT (OUTPATIENT)
Dept: CARDIOLOGY CLINIC | Age: 53
End: 2023-02-13
Payer: COMMERCIAL

## 2023-02-13 VITALS
HEART RATE: 72 BPM | DIASTOLIC BLOOD PRESSURE: 80 MMHG | SYSTOLIC BLOOD PRESSURE: 118 MMHG | HEIGHT: 68 IN | WEIGHT: 194.7 LBS | BODY MASS INDEX: 29.51 KG/M2

## 2023-02-13 DIAGNOSIS — I51.7 LVH (LEFT VENTRICULAR HYPERTROPHY): Primary | ICD-10-CM

## 2023-02-13 PROCEDURE — 99213 OFFICE O/P EST LOW 20 MIN: CPT | Performed by: INTERNAL MEDICINE

## 2023-02-13 ASSESSMENT — ENCOUNTER SYMPTOMS
EYES NEGATIVE: 1
ALLERGIC/IMMUNOLOGIC NEGATIVE: 1
GASTROINTESTINAL NEGATIVE: 1
SHORTNESS OF BREATH: 0
CHEST TIGHTNESS: 0
BACK PAIN: 0
PHOTOPHOBIA: 0
EYE PAIN: 0
RESPIRATORY NEGATIVE: 1
ABDOMINAL PAIN: 0

## 2023-02-13 NOTE — PROGRESS NOTES
[facility] CARDIOLOGY  [address], [address]  PHONE: [phone]      23    NAME:  Perry Lucero  : 1970  MRN: [id number]         SUBJECTIVE:   Perry Lucero is a 50 y.o. male seen for follow up of:      Chief Complaint   Patient presents with    Other     LVH (left ventricular hypertrophy)         Cardiac Hx (Reviewed and summarized by me):  1) JUANIS (?central sleep apnea)  2) HTN  3) LVH by ECG criteria  4) Echo   23 - LVEF 60-65% with moderate LVH normal LVDF      HPI:  Echo showed mild LVH with anterior/posterior wall thickness of 1.2 cm. Doing well otherwise. Is tolerating CPAP. Blood pressure is excellent today. Past Medical History, Past Surgical History, Family history, Social History, and Medications were all reviewed with the patient today and updated as necessary. Current Outpatient Medications:     fluticasone (FLONASE) 50 MCG/ACT nasal spray, 2 sprays by Nasal route 2 times daily, Disp: 16 g, Rfl: 2    cetirizine (ZYRTEC) 10 MG tablet, Take 10 mg by mouth daily (Patient not taking: Reported on 2023), Disp: , Rfl:   No Known Allergies  History reviewed. No pertinent past medical history. Past Surgical History:   Procedure Laterality Date    CHOLECYSTECTOMY, LAPAROSCOPIC  2020     Family History   Problem Relation Age of Onset    No Known Problems Mother     No Known Problems Father     No Known Problems Maternal Grandmother     No Known Problems Maternal Grandfather     No Known Problems Paternal Grandmother     No Known Problems Paternal Grandfather      Social History     Tobacco Use    Smoking status: Never    Smokeless tobacco: Never   Substance Use Topics    Alcohol use: Yes     Alcohol/week: 7.0 standard drinks       ROS:  Review of Systems   Constitutional: Negative. Negative for fever. HENT:  Negative for hearing loss, nosebleeds and tinnitus. Eyes: Negative. Negative for photophobia and pain. Respiratory: Negative. Negative for chest tightness and shortness of breath. Cardiovascular: Negative. Negative for chest pain, palpitations and leg swelling. Gastrointestinal: Negative. Negative for abdominal pain. Endocrine: Negative. Negative for cold intolerance and heat intolerance. Genitourinary: Negative. Negative for dysuria. Musculoskeletal: Negative. Negative for back pain and joint swelling. Skin: Negative. Negative for rash. Allergic/Immunologic: Negative. Negative for immunocompromised state. Neurological: Negative. Negative for dizziness, syncope and light-headedness. Hematological: Negative. Does not bruise/bleed easily. Psychiatric/Behavioral: Negative. Negative for suicidal ideas. PHYSICAL EXAM:  Physical Exam  Constitutional:       General: He is not in acute distress. Appearance: He is not ill-appearing. HENT:      Head: Normocephalic and atraumatic. Nose: No congestion. Mouth/Throat:      Mouth: Mucous membranes are moist.   Eyes:      Extraocular Movements: Extraocular movements intact. Pupils: Pupils are equal, round, and reactive to light. Cardiovascular:      Rate and Rhythm: Normal rate and regular rhythm. Heart sounds: No murmur heard. No friction rub. No gallop. Pulmonary:      Effort: No respiratory distress. Breath sounds: No wheezing or rhonchi. Musculoskeletal:         General: No swelling. Cervical back: Normal range of motion. Right lower leg: No edema. Left lower leg: No edema. Skin:     General: Skin is warm and dry. Findings: No rash. Neurological:      General: No focal deficit present. Mental Status: He is oriented to person, place, and time.    Psychiatric:         Mood and Affect: Mood normal.         Behavior: Behavior normal.         Judgment: Judgment normal.        /80   Pulse 72   Ht 5' 8\" (1.727 m)   Wt 194 lb 11.2 oz (88.3 kg)   BMI 29.60 kg/m²      Wt Readings from Last 10 Encounters:   02/13/23 194 lb 11.2 oz (88.3 kg)   01/20/23 195 lb (88.5 kg)   01/11/23 195 lb (88.5 kg)   01/12/23 195 lb (88.5 kg)   12/21/22 196 lb 1.6 oz (89 kg)   06/03/22 187 lb (84.8 kg)   03/14/22 197 lb (89.4 kg)   03/18/21 197 lb 3.2 oz (89.4 kg)           Medical problems and test results were reviewed with the patient today. Lab Results   Component Value Date/Time    BUN 11 11/04/2020 10:46 AM     No results found for: ALEX, CREAPOC, CREA  Lab Results   Component Value Date/Time    K 5.0 11/04/2020 10:46 AM       Lab Results   Component Value Date/Time    CHOL 202 11/04/2020 10:46 AM    HDL 45 11/04/2020 10:46 AM    VLDL 24 11/04/2020 10:46 AM       ASSESSMENT and PLAN    ICD-10-CM    1. LVH (left ventricular hypertrophy)  I51.7           IMPRESSION:  1) Central Sleep Apnea -now treated with CPAP  2) Abnormal ECG -shows mild LVH  3) HTN -hypertension has improved with CPAP    ALL ORDERS THIS ENCOUNTER  No orders of the defined types were placed in this encounter. Follow up in 12 months. Thank you for allowing me to participate in this patient's care. Please call or contact me if there are any questions or concerns regarding the above.       Cesario Agarwal MD  02/13/23  3:14 PM

## 2023-02-14 ENCOUNTER — OFFICE VISIT (OUTPATIENT)
Dept: NEUROSURGERY | Age: 53
End: 2023-02-14
Payer: COMMERCIAL

## 2023-02-14 VITALS
TEMPERATURE: 98.2 F | SYSTOLIC BLOOD PRESSURE: 133 MMHG | BODY MASS INDEX: 29.76 KG/M2 | OXYGEN SATURATION: 97 % | WEIGHT: 196.38 LBS | HEIGHT: 68 IN | DIASTOLIC BLOOD PRESSURE: 93 MMHG | HEART RATE: 82 BPM

## 2023-02-14 DIAGNOSIS — G47.31 CSA (CENTRAL SLEEP APNEA): ICD-10-CM

## 2023-02-14 DIAGNOSIS — G93.5 CHIARI MALFORMATION TYPE I (HCC): Primary | ICD-10-CM

## 2023-02-14 PROCEDURE — 99204 OFFICE O/P NEW MOD 45 MIN: CPT | Performed by: NEUROLOGICAL SURGERY

## 2023-02-14 RX ORDER — OMEPRAZOLE 10 MG/1
10 CAPSULE, DELAYED RELEASE ORAL DAILY
COMMUNITY

## 2023-03-24 NOTE — PROGRESS NOTES
[address] [place] [address] Court, [address] [facility]  [phone]    Patient Name:  Claudetta Manifold  YOB: 1970      Office Visit 3/27/2023    CHIEF COMPLAINT:    Chief Complaint   Patient presents with    Sleep Apnea    Follow-up    CPAP/BiPAP         HISTORY OF PRESENT ILLNESS:  Patient is a 49 yo  male seen today for follow up of sleep apnea. Patient's sleep study in 2022 revealed AHI of 37.8 with desaturation as low as 83%. He is prescribed cpap therapy with a humidifier set at 12-16cm with a full face mask. Most recent download reveals AHI on PAP therapy is 1.3, leak is 48 and the hourly usage is 5 hours 4 minutes nightly. The overall use is 303 hours with days greater than four hours at 44/90. The patient is compliant with the Pap therapy and is feeling better as a result. Underwent MRI after last visit which showed chiari malformation type 1 (very small per Dr. Klaudia Levi) and was evaluated by Neurosurgery. Per Dr. Sonia Terry note patient does not need surgical intervention and this does not appear to represent any etiology for his sleep apnea. Echocardiogram was also completed and was normal per Cardiology. Patient states he is not rested in the mornings related to mask issues, does have some daytime fatigue but denies any napping. Current [place] score is 11/24. He is falling asleep easily but having frequent awakenings. He states that his current mask restricts his nose and causes him to have breathing issues. He states the mask is also leaking which is waking him up. He feels that the pressure is too low when it is turned on. We discussed adjusting pressures as well as changing to an Hilda fullface mask and he is agreeable today.       Download        [place] Sleepiness Scale  Sleep Medicine 3/27/2023 12/21/2022 6/2/2022   Sitting and reading 3 2 3   Watching TV 2 2 3   Sitting, inactive in a public place (e.g. a theatre or a meeting) 0 2 2   As a passenger

## 2023-03-27 ENCOUNTER — OFFICE VISIT (OUTPATIENT)
Dept: SLEEP MEDICINE | Age: 53
End: 2023-03-27
Payer: COMMERCIAL

## 2023-03-27 VITALS
BODY MASS INDEX: 29.86 KG/M2 | TEMPERATURE: 97.3 F | HEART RATE: 78 BPM | OXYGEN SATURATION: 97 % | WEIGHT: 197 LBS | SYSTOLIC BLOOD PRESSURE: 118 MMHG | HEIGHT: 68 IN | DIASTOLIC BLOOD PRESSURE: 76 MMHG | RESPIRATION RATE: 14 BRPM

## 2023-03-27 DIAGNOSIS — G47.31 CSA (CENTRAL SLEEP APNEA): ICD-10-CM

## 2023-03-27 DIAGNOSIS — G47.33 OSA (OBSTRUCTIVE SLEEP APNEA): Primary | ICD-10-CM

## 2023-03-27 PROCEDURE — 99213 OFFICE O/P EST LOW 20 MIN: CPT | Performed by: STUDENT IN AN ORGANIZED HEALTH CARE EDUCATION/TRAINING PROGRAM

## 2023-03-27 ASSESSMENT — SLEEP AND FATIGUE QUESTIONNAIRES
HOW LIKELY ARE YOU TO NOD OFF OR FALL ASLEEP WHILE SITTING INACTIVE IN A PUBLIC PLACE: 0
HOW LIKELY ARE YOU TO NOD OFF OR FALL ASLEEP WHILE LYING DOWN TO REST IN THE AFTERNOON WHEN CIRCUMSTANCES PERMIT: 3
HOW LIKELY ARE YOU TO NOD OFF OR FALL ASLEEP WHILE SITTING QUIETLY AFTER LUNCH WITHOUT ALCOHOL: 2
HOW LIKELY ARE YOU TO NOD OFF OR FALL ASLEEP WHILE WATCHING TV: 2
HOW LIKELY ARE YOU TO NOD OFF OR FALL ASLEEP WHILE SITTING AND READING: 3
ESS TOTAL SCORE: 11
HOW LIKELY ARE YOU TO NOD OFF OR FALL ASLEEP WHILE SITTING AND TALKING TO SOMEONE: 0
HOW LIKELY ARE YOU TO NOD OFF OR FALL ASLEEP IN A CAR, WHILE STOPPED FOR A FEW MINUTES IN TRAFFIC: 0
HOW LIKELY ARE YOU TO NOD OFF OR FALL ASLEEP WHEN YOU ARE A PASSENGER IN A CAR FOR AN HOUR WITHOUT A BREAK: 1

## 2023-03-27 NOTE — PATIENT INSTRUCTIONS
The company who will be taking care of your CPAP supplies is:     Address: [address] [place] [address] [address], [facility], [address] [place] [address]  Phone: [phone] Option #1  Fax: [phone]

## 2023-10-19 NOTE — PROGRESS NOTES
[address] [place] [address]  [phone]    PATIENT ID    Mr. Schaefer Ao  1970  His primary provider is Omar Robles PA-C    CC: Mr. Kadi Flores returns to the clinic today for follow up of his obstructive sleep apnea. INTERVAL HISTORY  Mr. 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 He has past medical history notable for allergic rhinitis low vitamin D low testosterone. He was last seen by Naseem Bone and tried a new Hilda fullface mask but is finding this leaking quite a bit as well, his wife is washing his mask every couple of days. He had tried other fullface mask that led to nasion irritation but has never tried Vitera. He notes when the right eye occurs he does not have any leak he wakes up feeling more refreshed but this is the exception and not the norm, his sleep continues to be nonrestorative and wakes up with headaches when he is having trouble. He has been trying to get new supplies but has had trouble getting them delivered in a timely manner through [employer]. He continues to use fluticasone as needed for nasal congestion and this is part of the reason he does not want to use a nasal mask. He was last seen by Naseem Bone in 3/07/23 and was c struggling with CPAP therapy. Since implementing CPAP therapy Mr. Kadi Flores feels more rested and less fatigued when his CPAP works  On CPAP download review today he is utilizing his CPAP machine 14% of the time, greater than 4 hours per night, for a median daily usage of 3 hours 40 minutes per night. His AHI is down to 4.4, central apnea index average 1.4  and his leak rate is unacceptably high almost every visit.   Past medical and surgical histories, medications and allergies, problem list, and social

## 2023-10-20 ENCOUNTER — OFFICE VISIT (OUTPATIENT)
Dept: SLEEP MEDICINE | Age: 53
End: 2023-10-20
Payer: COMMERCIAL

## 2023-10-20 VITALS
DIASTOLIC BLOOD PRESSURE: 80 MMHG | WEIGHT: 191.8 LBS | RESPIRATION RATE: 16 BRPM | TEMPERATURE: 97.3 F | OXYGEN SATURATION: 97 % | HEART RATE: 78 BPM | HEIGHT: 68 IN | SYSTOLIC BLOOD PRESSURE: 120 MMHG | BODY MASS INDEX: 29.07 KG/M2

## 2023-10-20 DIAGNOSIS — G47.33 OSA (OBSTRUCTIVE SLEEP APNEA): Primary | ICD-10-CM

## 2023-10-20 DIAGNOSIS — G47.31 CSA (CENTRAL SLEEP APNEA): ICD-10-CM

## 2023-10-20 PROCEDURE — 99214 OFFICE O/P EST MOD 30 MIN: CPT | Performed by: INTERNAL MEDICINE

## 2023-10-20 ASSESSMENT — SLEEP AND FATIGUE QUESTIONNAIRES
HOW LIKELY ARE YOU TO NOD OFF OR FALL ASLEEP WHILE WATCHING TV: 2
HOW LIKELY ARE YOU TO NOD OFF OR FALL ASLEEP WHEN YOU ARE A PASSENGER IN A CAR FOR AN HOUR WITHOUT A BREAK: 2
HOW LIKELY ARE YOU TO NOD OFF OR FALL ASLEEP WHILE SITTING INACTIVE IN A PUBLIC PLACE: 2
HOW LIKELY ARE YOU TO NOD OFF OR FALL ASLEEP WHILE SITTING AND TALKING TO SOMEONE: 0
ESS TOTAL SCORE: 16
HOW LIKELY ARE YOU TO NOD OFF OR FALL ASLEEP WHILE SITTING AND READING: 3
HOW LIKELY ARE YOU TO NOD OFF OR FALL ASLEEP WHILE LYING DOWN TO REST IN THE AFTERNOON WHEN CIRCUMSTANCES PERMIT: 3
HOW LIKELY ARE YOU TO NOD OFF OR FALL ASLEEP IN A CAR, WHILE STOPPED FOR A FEW MINUTES IN TRAFFIC: 1
HOW LIKELY ARE YOU TO NOD OFF OR FALL ASLEEP WHILE SITTING QUIETLY AFTER LUNCH WITHOUT ALCOHOL: 3

## 2023-10-20 NOTE — PATIENT INSTRUCTIONS
It was a pleasure meeting you today. Here are some items that we discussed:    1. I will order a new mask call AMANDA Gonzales to try to help with leaks, make sure your mask is clean as well.     Anam Alanis MD  [phone]

## 2023-10-20 NOTE — ASSESSMENT & PLAN NOTE
Patient has combined obstructive and central sleep apnea that is severe, download today shows good control with patient wearing his mask however he is having tremendous difficulty with leak, we did a mask fitting in clinic today and provided him with a sample Vitera mask small, he may actually need a medium he is right on the line, will order Vitera mask that [address] as well, we will pull a download in 3 weeks to see if his leak is improved. No changes otherwise to therapy, follow-up in 6 months.

## 2023-11-22 ENCOUNTER — TELEPHONE (OUTPATIENT)
Dept: SLEEP MEDICINE | Age: 53
End: 2023-11-22

## 2023-11-22 NOTE — TELEPHONE ENCOUNTER
----- Message from Shemar Khan III, MD sent at 10/20/2023  3:10 PM EDT -----  Regarding: pull download  Pls pull download on new mask ?  Leak improved

## 2023-12-11 NOTE — TELEPHONE ENCOUNTER
Download reviewed and shows essentially no use, called patient and he notes he was just simply getting frustrated with therapy but is going to start back again tonight. We discussed other options potentially including inspire therapy but he had too many central events to qualify on last sleep study. Interestingly however these appear well treated on CPAP and we may consider repeating a home test at follow-up if he is not doing well on PAP therapy to verify the presence of centrals. He did see neurosurgery who felt his very mild Chiari malformation is not in any way contributing to this. Reviewed with patient and he is agreeable with plan. Will see him in April as scheduled.

## 2024-04-17 ENCOUNTER — OFFICE VISIT (OUTPATIENT)
Dept: FAMILY MEDICINE CLINIC | Facility: CLINIC | Age: 54
End: 2024-04-17

## 2024-04-17 VITALS
DIASTOLIC BLOOD PRESSURE: 83 MMHG | OXYGEN SATURATION: 98 % | HEART RATE: 84 BPM | HEIGHT: 68 IN | TEMPERATURE: 98.1 F | SYSTOLIC BLOOD PRESSURE: 129 MMHG | BODY MASS INDEX: 29.7 KG/M2 | WEIGHT: 196 LBS

## 2024-04-17 DIAGNOSIS — R35.1 NOCTURIA: ICD-10-CM

## 2024-04-17 DIAGNOSIS — J30.2 SEASONAL ALLERGIC RHINITIS, UNSPECIFIED TRIGGER: ICD-10-CM

## 2024-04-17 DIAGNOSIS — K21.9 GASTROESOPHAGEAL REFLUX DISEASE WITHOUT ESOPHAGITIS: ICD-10-CM

## 2024-04-17 DIAGNOSIS — G47.33 OSA ON CPAP: ICD-10-CM

## 2024-04-17 DIAGNOSIS — Z00.00 ROUTINE PHYSICAL EXAMINATION: Primary | ICD-10-CM

## 2024-04-17 DIAGNOSIS — Z13.220 SCREENING CHOLESTEROL LEVEL: ICD-10-CM

## 2024-04-17 DIAGNOSIS — M54.50 CHRONIC RIGHT-SIDED LOW BACK PAIN WITHOUT SCIATICA: ICD-10-CM

## 2024-04-17 DIAGNOSIS — Z13.1 SCREENING FOR DIABETES MELLITUS: ICD-10-CM

## 2024-04-17 DIAGNOSIS — G89.29 CHRONIC RIGHT-SIDED LOW BACK PAIN WITHOUT SCIATICA: ICD-10-CM

## 2024-04-17 DIAGNOSIS — Z12.11 SCREENING FOR COLON CANCER: ICD-10-CM

## 2024-04-17 LAB
ALBUMIN SERPL-MCNC: 3.9 G/DL (ref 3.5–5)
ALBUMIN/GLOB SERPL: 1.1 (ref 0.4–1.6)
ALP SERPL-CCNC: 106 U/L (ref 50–136)
ALT SERPL-CCNC: 43 U/L (ref 12–65)
ANION GAP SERPL CALC-SCNC: 3 MMOL/L (ref 2–11)
AST SERPL-CCNC: 26 U/L (ref 15–37)
BASOPHILS # BLD: 0.1 K/UL (ref 0–0.2)
BASOPHILS NFR BLD: 1 % (ref 0–2)
BILIRUB SERPL-MCNC: 0.5 MG/DL (ref 0.2–1.1)
BUN SERPL-MCNC: 11 MG/DL (ref 6–23)
CALCIUM SERPL-MCNC: 9.7 MG/DL (ref 8.3–10.4)
CHLORIDE SERPL-SCNC: 108 MMOL/L (ref 103–113)
CHOLEST SERPL-MCNC: 158 MG/DL
CO2 SERPL-SCNC: 28 MMOL/L (ref 21–32)
CREAT SERPL-MCNC: 0.9 MG/DL (ref 0.8–1.5)
DIFFERENTIAL METHOD BLD: ABNORMAL
EOSINOPHIL # BLD: 0.1 K/UL (ref 0–0.8)
EOSINOPHIL NFR BLD: 3 % (ref 0.5–7.8)
ERYTHROCYTE [DISTWIDTH] IN BLOOD BY AUTOMATED COUNT: 15.5 % (ref 11.9–14.6)
EST. AVERAGE GLUCOSE BLD GHB EST-MCNC: 123 MG/DL
GLOBULIN SER CALC-MCNC: 3.6 G/DL (ref 2.8–4.5)
GLUCOSE SERPL-MCNC: 114 MG/DL (ref 65–100)
HBA1C MFR BLD: 5.9 % (ref 4.8–5.6)
HCT VFR BLD AUTO: 40 % (ref 41.1–50.3)
HDLC SERPL-MCNC: 35 MG/DL (ref 40–60)
HDLC SERPL: 4.5
HGB BLD-MCNC: 11.7 G/DL (ref 13.6–17.2)
IMM GRANULOCYTES # BLD AUTO: 0 K/UL (ref 0–0.5)
IMM GRANULOCYTES NFR BLD AUTO: 1 % (ref 0–5)
LDLC SERPL CALC-MCNC: 105.8 MG/DL
LYMPHOCYTES # BLD: 1.7 K/UL (ref 0.5–4.6)
LYMPHOCYTES NFR BLD: 30 % (ref 13–44)
MCH RBC QN AUTO: 23 PG (ref 26.1–32.9)
MCHC RBC AUTO-ENTMCNC: 29.3 G/DL (ref 31.4–35)
MCV RBC AUTO: 78.7 FL (ref 82–102)
MONOCYTES # BLD: 0.7 K/UL (ref 0.1–1.3)
MONOCYTES NFR BLD: 12 % (ref 4–12)
NEUTS SEG # BLD: 3 K/UL (ref 1.7–8.2)
NEUTS SEG NFR BLD: 54 % (ref 43–78)
NRBC # BLD: 0 K/UL (ref 0–0.2)
PLATELET # BLD AUTO: 332 K/UL (ref 150–450)
PMV BLD AUTO: 10.1 FL (ref 9.4–12.3)
POTASSIUM SERPL-SCNC: 4.5 MMOL/L (ref 3.5–5.1)
PROT SERPL-MCNC: 7.5 G/DL (ref 6.3–8.2)
PSA SERPL-MCNC: 1.8 NG/ML
RBC # BLD AUTO: 5.08 M/UL (ref 4.23–5.6)
SODIUM SERPL-SCNC: 139 MMOL/L (ref 136–146)
TRIGL SERPL-MCNC: 86 MG/DL (ref 35–150)
TSH W FREE THYROID IF ABNORMAL: 1.14 UIU/ML (ref 0.36–3.74)
VLDLC SERPL CALC-MCNC: 17.2 MG/DL (ref 6–23)
WBC # BLD AUTO: 5.6 K/UL (ref 4.3–11.1)

## 2024-04-17 RX ORDER — FLUTICASONE PROPIONATE 50 MCG
2 SPRAY, SUSPENSION (ML) NASAL 2 TIMES DAILY
Qty: 48 G | Refills: 3 | Status: SHIPPED | OUTPATIENT
Start: 2024-04-17

## 2024-04-17 RX ORDER — METHYLPREDNISOLONE ACETATE 40 MG/ML
40 INJECTION, SUSPENSION INTRA-ARTICULAR; INTRALESIONAL; INTRAMUSCULAR; SOFT TISSUE ONCE
Status: COMPLETED | OUTPATIENT
Start: 2024-04-17 | End: 2024-04-17

## 2024-04-17 RX ADMIN — METHYLPREDNISOLONE ACETATE 40 MG: 40 INJECTION, SUSPENSION INTRA-ARTICULAR; INTRALESIONAL; INTRAMUSCULAR; SOFT TISSUE at 10:10

## 2024-04-17 SDOH — ECONOMIC STABILITY: INCOME INSECURITY: HOW HARD IS IT FOR YOU TO PAY FOR THE VERY BASICS LIKE FOOD, HOUSING, MEDICAL CARE, AND HEATING?: NOT HARD AT ALL

## 2024-04-17 SDOH — ECONOMIC STABILITY: HOUSING INSECURITY
IN THE LAST 12 MONTHS, WAS THERE A TIME WHEN YOU DID NOT HAVE A STEADY PLACE TO SLEEP OR SLEPT IN A SHELTER (INCLUDING NOW)?: NO

## 2024-04-17 SDOH — ECONOMIC STABILITY: FOOD INSECURITY: WITHIN THE PAST 12 MONTHS, THE FOOD YOU BOUGHT JUST DIDN'T LAST AND YOU DIDN'T HAVE MONEY TO GET MORE.: NEVER TRUE

## 2024-04-17 SDOH — ECONOMIC STABILITY: FOOD INSECURITY: WITHIN THE PAST 12 MONTHS, YOU WORRIED THAT YOUR FOOD WOULD RUN OUT BEFORE YOU GOT MONEY TO BUY MORE.: NEVER TRUE

## 2024-04-17 ASSESSMENT — PATIENT HEALTH QUESTIONNAIRE - PHQ9
1. LITTLE INTEREST OR PLEASURE IN DOING THINGS: MORE THAN HALF THE DAYS
4. FEELING TIRED OR HAVING LITTLE ENERGY: SEVERAL DAYS
5. POOR APPETITE OR OVEREATING: SEVERAL DAYS
7. TROUBLE CONCENTRATING ON THINGS, SUCH AS READING THE NEWSPAPER OR WATCHING TELEVISION: NOT AT ALL
10. IF YOU CHECKED OFF ANY PROBLEMS, HOW DIFFICULT HAVE THESE PROBLEMS MADE IT FOR YOU TO DO YOUR WORK, TAKE CARE OF THINGS AT HOME, OR GET ALONG WITH OTHER PEOPLE: SOMEWHAT DIFFICULT
SUM OF ALL RESPONSES TO PHQ QUESTIONS 1-9: 6
4. FEELING TIRED OR HAVING LITTLE ENERGY: SEVERAL DAYS
10. IF YOU CHECKED OFF ANY PROBLEMS, HOW DIFFICULT HAVE THESE PROBLEMS MADE IT FOR YOU TO DO YOUR WORK, TAKE CARE OF THINGS AT HOME, OR GET ALONG WITH OTHER PEOPLE: SOMEWHAT DIFFICULT
SUM OF ALL RESPONSES TO PHQ QUESTIONS 1-9: 6
8. MOVING OR SPEAKING SO SLOWLY THAT OTHER PEOPLE COULD HAVE NOTICED. OR THE OPPOSITE, BEING SO FIGETY OR RESTLESS THAT YOU HAVE BEEN MOVING AROUND A LOT MORE THAN USUAL: NOT AT ALL
3. TROUBLE FALLING OR STAYING ASLEEP: SEVERAL DAYS
7. TROUBLE CONCENTRATING ON THINGS, SUCH AS READING THE NEWSPAPER OR WATCHING TELEVISION: NOT AT ALL
3. TROUBLE FALLING OR STAYING ASLEEP: SEVERAL DAYS
9. THOUGHTS THAT YOU WOULD BE BETTER OFF DEAD, OR OF HURTING YOURSELF: NOT AT ALL
2. FEELING DOWN, DEPRESSED OR HOPELESS: SEVERAL DAYS
2. FEELING DOWN, DEPRESSED OR HOPELESS: SEVERAL DAYS
5. POOR APPETITE OR OVEREATING: SEVERAL DAYS
SUM OF ALL RESPONSES TO PHQ9 QUESTIONS 1 & 2: 3
SUM OF ALL RESPONSES TO PHQ QUESTIONS 1-9: 6
SUM OF ALL RESPONSES TO PHQ QUESTIONS 1-9: 6
9. THOUGHTS THAT YOU WOULD BE BETTER OFF DEAD, OR OF HURTING YOURSELF: NOT AT ALL
6. FEELING BAD ABOUT YOURSELF - OR THAT YOU ARE A FAILURE OR HAVE LET YOURSELF OR YOUR FAMILY DOWN: NOT AT ALL
8. MOVING OR SPEAKING SO SLOWLY THAT OTHER PEOPLE COULD HAVE NOTICED. OR THE OPPOSITE - BEING SO FIDGETY OR RESTLESS THAT YOU HAVE BEEN MOVING AROUND A LOT MORE THAN USUAL: NOT AT ALL
SUM OF ALL RESPONSES TO PHQ QUESTIONS 1-9: 6
1. LITTLE INTEREST OR PLEASURE IN DOING THINGS: MORE THAN HALF THE DAYS
SUM OF ALL RESPONSES TO PHQ9 QUESTIONS 1 & 2: 3
6. FEELING BAD ABOUT YOURSELF - OR THAT YOU ARE A FAILURE OR HAVE LET YOURSELF OR YOUR FAMILY DOWN: NOT AT ALL

## 2024-04-17 ASSESSMENT — ENCOUNTER SYMPTOMS
DIARRHEA: 0
BACK PAIN: 1
SINUS PRESSURE: 1
CHEST TIGHTNESS: 0
EYE ITCHING: 1
VOMITING: 0
ALLERGIC/IMMUNOLOGIC NEGATIVE: 1
COUGH: 0
ABDOMINAL PAIN: 0
CONSTIPATION: 0
NAUSEA: 0
EYE DISCHARGE: 1
SHORTNESS OF BREATH: 0
BLOOD IN STOOL: 0

## 2024-04-17 NOTE — PROGRESS NOTES
Chief Complaint   Patient presents with    Annual Exam     Wants a shot for his allergies    Orders     Needs colonoscopy     HISTORY OF PRESENT ILLNESS:  Jam Olivia is a very pleasant 53 y.o. male who presents for a general physical. He reports a normal energy level, appetite, and sleep habits. He does/does not exercise regularly. He does not use tobacco or drink ETOH regularly. His PMH is significant for allergic rhinitis, GERD, JUANIS  Complains of worsening allergy symptoms over the last month. Symptoms include sneezing, nasal congestion, itchy/watery eyes, rhinitis. He normally takes cetirizine and flonase prn. Steroid shots have been helpful in the past and would like to get one today. Allergies are worse in the spring.   He also complains of chronic right sided back pain (thoracic and lumbar) that worsens periodically. Reports back stiffness, pain with bending/lifting. He denies radicular pain, leg paresthesias/weakness, bowel/bladder dysfunction. Possibly had an XR years ago- no report in chart. He is inquiring about medication he can try to help with his pain prn.         HEALTH MAINTENANCE:   Colon Cancer Screening- never had, no fhx of colon cancer. No current symptoms, opts   Prostate Cancer Screening- no fhx of prostate cancer. +nocturia  Influenza Vaccine- did not receive for [id number]  COVID-19 vaccine- has not received, recommended  Shingles Vaccine- has not received, recommended  Annual Eye Exams- no recent exam  Annual Dental Exams- goes regularly    PAST MEDICAL HISTORY  Current Outpatient Medications   Medication Sig    fluticasone (FLONASE) 50 MCG/ACT nasal spray 2 sprays by Nasal route 2 times daily    omeprazole (PRILOSEC) 10 MG delayed release capsule Take 1 capsule by mouth daily    cetirizine (ZYRTEC) 10 MG tablet Take 1 tablet by mouth daily     No current facility-administered medications for this visit.      No Known Allergies   Past Medical History:   Diagnosis Date    GERD

## 2024-04-19 ENCOUNTER — TELEPHONE (OUTPATIENT)
Dept: FAMILY MEDICINE CLINIC | Facility: CLINIC | Age: 54
End: 2024-04-19

## 2024-04-19 DIAGNOSIS — D50.9 MICROCYTIC ANEMIA: ICD-10-CM

## 2024-04-19 DIAGNOSIS — D50.9 MICROCYTIC ANEMIA: Primary | ICD-10-CM

## 2024-04-19 LAB
HEMOCCULT STL QL: NEGATIVE
VALID INTERNAL CONTROL: YES

## 2024-04-19 RX ORDER — MELOXICAM 15 MG/1
15 TABLET ORAL DAILY PRN
Qty: 90 TABLET | Refills: 0 | Status: SHIPPED | OUTPATIENT
Start: 2024-04-19

## 2024-04-19 RX ORDER — TIZANIDINE 4 MG/1
4 TABLET ORAL 3 TIMES DAILY PRN
Qty: 90 TABLET | Refills: 0 | Status: SHIPPED | OUTPATIENT
Start: 2024-04-19

## 2024-04-19 NOTE — TELEPHONE ENCOUNTER
From visit on 4/17  \"Check fasting labs today. If renal function normal, will try nsaid for back pain prn, along with muscle relaxant.\"    Pt asking if this can be sent in since labs came back. Jeronimo in [place].

## 2024-04-20 LAB — FERRITIN SERPL-MCNC: 5 NG/ML (ref 8–388)

## 2024-04-22 DIAGNOSIS — D50.9 IRON DEFICIENCY ANEMIA, UNSPECIFIED IRON DEFICIENCY ANEMIA TYPE: Primary | ICD-10-CM

## 2024-04-29 NOTE — PROGRESS NOTES
CPAP and we discussed inspire therapy as a potential option but would need to do a home test to make sure he does not have more than 25% centrals (actually his centrals have been well-controlled on CPAP).  We also discussed bilevel therapy and he would like to try this first so we will order a titration study starting at 14/10 and adding ST mode if centrals are seen.  We will call him 2 weeks after that to likely get started on therapy at that time.     I did ask for [employer] to help with the mask fitting as well ideally before his study, and perhaps overnight on the study if he is having similar issues that he is having at home the technicians can help him with mask fitting at that time as well.  Orders:  -     DME - DURABLE MEDICAL EQUIPMENT  -     Ambulatory Referral to Sleep Studies  2. CSA (central sleep apnea)  -     Ambulatory Referral to Sleep Studies       Mr. Olivia is not compliant with appropriate usage andnot  getting good clinical response to his JUANIS treatment.   He should not drive if drowsy or if he has had inadequate sleep.    I will see him back in 4 months.       Time Spent: Time spent total 22 minutes exclusive of procedures, during the 24 hour period of the date of encounter: preparing to see the patient (e.g. reviewing chart notes, tests), performing a medically appropriate examination and/or evaluation, documenting clinical information in the electronic or other health record, counseling and educating the patient/family/caregiver, obtaining and/or reviewing separately obtained history and ordering medications, tests, procedures.   CPAP download was reviewed with the patient in detail today.  We also reviewed his blood work and pointed out that he does have mild anemia and he needs to discuss this with his regular team, he is awaiting a hematology visit later this year.    Josue Levy MD  Sleep Medicine/Internal Medicine/Pediatrics

## 2024-04-30 ENCOUNTER — OFFICE VISIT (OUTPATIENT)
Dept: SLEEP MEDICINE | Age: 54
End: 2024-04-30
Payer: COMMERCIAL

## 2024-04-30 VITALS
HEART RATE: 79 BPM | OXYGEN SATURATION: 92 % | RESPIRATION RATE: 16 BRPM | TEMPERATURE: 98.4 F | HEIGHT: 68 IN | SYSTOLIC BLOOD PRESSURE: 129 MMHG | DIASTOLIC BLOOD PRESSURE: 80 MMHG | BODY MASS INDEX: 30.1 KG/M2 | WEIGHT: 198.6 LBS

## 2024-04-30 DIAGNOSIS — G47.31 CSA (CENTRAL SLEEP APNEA): ICD-10-CM

## 2024-04-30 DIAGNOSIS — G47.33 OSA (OBSTRUCTIVE SLEEP APNEA): Primary | ICD-10-CM

## 2024-04-30 PROCEDURE — 99213 OFFICE O/P EST LOW 20 MIN: CPT | Performed by: INTERNAL MEDICINE

## 2024-04-30 ASSESSMENT — SLEEP AND FATIGUE QUESTIONNAIRES
HOW LIKELY ARE YOU TO NOD OFF OR FALL ASLEEP WHILE LYING DOWN TO REST IN THE AFTERNOON WHEN CIRCUMSTANCES PERMIT: HIGH CHANCE OF DOZING
HOW LIKELY ARE YOU TO NOD OFF OR FALL ASLEEP WHILE SITTING AND READING: SLIGHT CHANCE OF DOZING
HOW LIKELY ARE YOU TO NOD OFF OR FALL ASLEEP WHILE SITTING INACTIVE IN A PUBLIC PLACE: SLIGHT CHANCE OF DOZING
ESS TOTAL SCORE: 9
HOW LIKELY ARE YOU TO NOD OFF OR FALL ASLEEP WHILE SITTING QUIETLY AFTER LUNCH WITHOUT ALCOHOL: WOULD NEVER DOZE
HOW LIKELY ARE YOU TO NOD OFF OR FALL ASLEEP WHEN YOU ARE A PASSENGER IN A CAR FOR AN HOUR WITHOUT A BREAK: MODERATE CHANCE OF DOZING
HOW LIKELY ARE YOU TO NOD OFF OR FALL ASLEEP WHILE WATCHING TV: MODERATE CHANCE OF DOZING
HOW LIKELY ARE YOU TO NOD OFF OR FALL ASLEEP WHILE SITTING AND TALKING TO SOMEONE: WOULD NEVER DOZE
HOW LIKELY ARE YOU TO NOD OFF OR FALL ASLEEP IN A CAR, WHILE STOPPED FOR A FEW MINUTES IN TRAFFIC: WOULD NEVER DOZE

## 2024-04-30 NOTE — ASSESSMENT & PLAN NOTE
Patient has combined obstructive and central sleep apnea that is severe, download today shows good control with patient wearing his mask however he continues having tremendous difficulty with leak, we did a mask fitting i previously .  At this point I think we need to change therapy as he is starting to have some difficulty with breathing out against CPAP and we discussed inspire therapy as a potential option but would need to do a home test to make sure he does not have more than 25% centrals (actually his centrals have been well-controlled on CPAP).  We also discussed bilevel therapy and he would like to try this first so we will order a titration study starting at 14/10 and adding ST mode if centrals are seen.  We will call him 2 weeks after that to likely get started on therapy at that time.     [facility] mask fitting as well ideally before his study, and perhaps overnight on the study if he is having similar issues that he is having at home the technicians can help him with mask fitting at that time as well.

## 2024-04-30 NOTE — PATIENT INSTRUCTIONS
It was a pleasure meeting you today.  Here are some items that we discussed:    1.   We will order a BiPAP titration study to see if this therapy is more comfortable and we can have more success with this.  We will call you about 2 weeks after the study is done to likely get a BiPAP machine at that time.    2.   Talk to your doctor about your mild anemia, I do not think this is related to your sleep apnea.    3.  For now continue to try to use your CPAP as when you are using it your breathing is much better, you can talk to [employer] to see if they can help you once again with the mask fitting, I will order supplies today.  If you are still having trouble with the mask and tubing and machine please bring everything in so we can do a mask fitting in the clinic.  We will tentatively plan to see you back in about 4 months    Josue Levy MD  [phone]

## 2024-05-17 RX ORDER — TIZANIDINE 4 MG/1
4 TABLET ORAL 3 TIMES DAILY PRN
Qty: 90 TABLET | Refills: 0 | Status: SHIPPED | OUTPATIENT
Start: 2024-05-17

## 2024-07-15 RX ORDER — MELOXICAM 15 MG/1
15 TABLET ORAL DAILY PRN
Qty: 90 TABLET | Refills: 2 | Status: SHIPPED | OUTPATIENT
Start: 2024-07-15

## 2024-08-09 NOTE — PROGRESS NOTES
NEW PATIENT INTAKE    Referral Diagnosis: Iron deficiency anemia, unspecified iron deficiency anemia type      Referring Provider: Skylar Cortes PA-C    Primary Care Provider: Skylar Cortes PA-C    Family History of Cancer/ Hematology Disorders: Mother with unknown cancer    Presenting Symptoms: Iron deficiency anemia, unspecified iron deficiency anemia type    Chronological History of Pertinent Events:     55yo white male    PMH: allergic rhinitis, GERD, JUANIS, Nocturia, Chronic low back pain  [facility]    4/17/24 - Met with PCP ([facility])  Here for AWV  He reports a normal energy level, appetite, and sleep habits.   Denies tobacco use or drink ETOH regularly.   Complains of worsening allergy symptoms over the last month. Symptoms include sneezing, nasal congestion, itchy/watery eyes, rhinitis.   He normally takes cetirizine and flonase prn. Steroid shots have been helpful in the past and would like to get one today. Allergies are worse in the spring.   He also complains of chronic right sided back pain (thoracic and lumbar) that worsens periodically. Reports back stiffness, pain with bending/lifting.   He denies radicular pain, leg paresthesias/weakness, bowel/bladder dysfunction.   Possibly had an XR years ago- no report in chart. He is inquiring about medication he can try to help with his pain prn.    Check fasting labs today. If renal function normal, will try nsaid for back pain prn, along with muscle relaxant.   Will also order XR of lumbar spine.   Continue cetirizine and flonase daily for allergies. If no improvement, can add singulair.   Agreed to FIT test; negative    Abnormal labs (4/17/24 - [facility])  Hgb - 11.7  Hct - 40.0  MCV - 78.7  MCH - 23.0  MCHC - 29.3  RDW - 15.5  Ferritin - 5    A referral has been placed with [facility] for a Medical Hematology consultation and treatment.      Notes from Referring Provider: N/A    Presented at Tumor Board: No    Other Pertinent Information: N/A

## 2025-04-10 DIAGNOSIS — J30.2 SEASONAL ALLERGIC RHINITIS, UNSPECIFIED TRIGGER: ICD-10-CM

## 2025-04-10 RX ORDER — FLUTICASONE PROPIONATE 50 MCG
SPRAY, SUSPENSION (ML) NASAL
Qty: 48 G | Refills: 3 | OUTPATIENT
Start: 2025-04-10

## 2025-04-10 RX ORDER — MELOXICAM 15 MG/1
15 TABLET ORAL DAILY PRN
Qty: 90 TABLET | Refills: 2 | OUTPATIENT
Start: 2025-04-10

## 2025-08-13 ENCOUNTER — OFFICE VISIT (OUTPATIENT)
Dept: PRIMARY CARE CLINIC | Facility: CLINIC | Age: 55
End: 2025-08-13
Payer: COMMERCIAL

## 2025-08-13 VITALS
HEIGHT: 68 IN | OXYGEN SATURATION: 97 % | DIASTOLIC BLOOD PRESSURE: 88 MMHG | TEMPERATURE: 98.3 F | WEIGHT: 194 LBS | BODY MASS INDEX: 29.4 KG/M2 | SYSTOLIC BLOOD PRESSURE: 148 MMHG | HEART RATE: 68 BPM | RESPIRATION RATE: 18 BRPM

## 2025-08-13 DIAGNOSIS — K21.9 GASTROESOPHAGEAL REFLUX DISEASE, UNSPECIFIED WHETHER ESOPHAGITIS PRESENT: ICD-10-CM

## 2025-08-13 DIAGNOSIS — J30.2 SEASONAL ALLERGIC RHINITIS, UNSPECIFIED TRIGGER: ICD-10-CM

## 2025-08-13 DIAGNOSIS — R03.0 ELEVATED BLOOD PRESSURE READING: ICD-10-CM

## 2025-08-13 DIAGNOSIS — M62.830 MUSCLE SPASM OF BACK: Primary | ICD-10-CM

## 2025-08-13 PROCEDURE — 99214 OFFICE O/P EST MOD 30 MIN: CPT | Performed by: NURSE PRACTITIONER

## 2025-08-13 RX ORDER — FLUTICASONE PROPIONATE 50 MCG
2 SPRAY, SUSPENSION (ML) NASAL 2 TIMES DAILY
Qty: 48 G | Refills: 1 | Status: SHIPPED | OUTPATIENT
Start: 2025-08-13

## 2025-08-13 RX ORDER — MELOXICAM 15 MG/1
15 TABLET ORAL DAILY PRN
Qty: 30 TABLET | Refills: 2 | Status: SHIPPED | OUTPATIENT
Start: 2025-08-13

## 2025-08-13 RX ORDER — OMEPRAZOLE 20 MG/1
20 CAPSULE, DELAYED RELEASE ORAL
Qty: 30 CAPSULE | Refills: 2 | Status: SHIPPED | OUTPATIENT
Start: 2025-08-13

## 2025-08-13 RX ORDER — OMEPRAZOLE 10 MG/1
10 CAPSULE, DELAYED RELEASE ORAL DAILY
Qty: 30 CAPSULE | Refills: 2 | Status: SHIPPED | OUTPATIENT
Start: 2025-08-13 | End: 2025-08-13 | Stop reason: DRUGHIGH

## 2025-08-13 SDOH — ECONOMIC STABILITY: FOOD INSECURITY: WITHIN THE PAST 12 MONTHS, YOU WORRIED THAT YOUR FOOD WOULD RUN OUT BEFORE YOU GOT MONEY TO BUY MORE.: NEVER TRUE

## 2025-08-13 SDOH — ECONOMIC STABILITY: FOOD INSECURITY: WITHIN THE PAST 12 MONTHS, THE FOOD YOU BOUGHT JUST DIDN'T LAST AND YOU DIDN'T HAVE MONEY TO GET MORE.: NEVER TRUE

## 2025-08-13 SDOH — HEALTH STABILITY: PHYSICAL HEALTH: ON AVERAGE, HOW MANY DAYS PER WEEK DO YOU ENGAGE IN MODERATE TO STRENUOUS EXERCISE (LIKE A BRISK WALK)?: 4 DAYS

## 2025-08-13 SDOH — HEALTH STABILITY: PHYSICAL HEALTH: ON AVERAGE, HOW MANY MINUTES DO YOU ENGAGE IN EXERCISE AT THIS LEVEL?: 60 MIN

## 2025-08-13 ASSESSMENT — ENCOUNTER SYMPTOMS
NAUSEA: 0
SINUS PAIN: 0
BLOOD IN STOOL: 0
BACK PAIN: 1
DIARRHEA: 0
CONSTIPATION: 0
COUGH: 0
VOMITING: 0
SHORTNESS OF BREATH: 0
SORE THROAT: 0
EYE PAIN: 0
EYE REDNESS: 0

## 2025-08-13 ASSESSMENT — PATIENT HEALTH QUESTIONNAIRE - PHQ9
SUM OF ALL RESPONSES TO PHQ QUESTIONS 1-9: 0
1. LITTLE INTEREST OR PLEASURE IN DOING THINGS: NOT AT ALL
2. FEELING DOWN, DEPRESSED OR HOPELESS: NOT AT ALL
SUM OF ALL RESPONSES TO PHQ QUESTIONS 1-9: 0